# Patient Record
Sex: MALE | Race: BLACK OR AFRICAN AMERICAN | Employment: STUDENT | URBAN - METROPOLITAN AREA
[De-identification: names, ages, dates, MRNs, and addresses within clinical notes are randomized per-mention and may not be internally consistent; named-entity substitution may affect disease eponyms.]

---
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
## 2023-07-22 ENCOUNTER — HOSPITAL ENCOUNTER (EMERGENCY)
Facility: HOSPITAL | Age: 18
Discharge: HOME | End: 2023-07-22
Attending: EMERGENCY MEDICINE
Payer: COMMERCIAL

## 2023-07-22 ENCOUNTER — APPOINTMENT (OUTPATIENT)
Facility: HOSPITAL | Age: 18
End: 2023-07-22
Payer: COMMERCIAL

## 2023-07-22 VITALS
HEART RATE: 65 BPM | BODY MASS INDEX: 34.86 KG/M2 | TEMPERATURE: 98.5 F | WEIGHT: 230 LBS | OXYGEN SATURATION: 98 % | HEIGHT: 68 IN | DIASTOLIC BLOOD PRESSURE: 85 MMHG | SYSTOLIC BLOOD PRESSURE: 131 MMHG | RESPIRATION RATE: 16 BRPM
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
## 2023-07-22 DIAGNOSIS — M25.551 ACUTE RIGHT HIP PAIN: Primary | ICD-10-CM

## 2023-07-22 PROCEDURE — 6360000002 HC RX W HCPCS: Performed by: EMERGENCY MEDICINE

## 2023-07-22 PROCEDURE — 96372 THER/PROPH/DIAG INJ SC/IM: CPT

## 2023-07-22 PROCEDURE — 73502 X-RAY EXAM HIP UNI 2-3 VIEWS: CPT

## 2023-07-22 PROCEDURE — 99284 EMERGENCY DEPT VISIT MOD MDM: CPT

## 2023-07-22 RX ORDER — KETOROLAC TROMETHAMINE 30 MG/ML
30 INJECTION, SOLUTION INTRAMUSCULAR; INTRAVENOUS
Status: COMPLETED | OUTPATIENT
Start: 2023-07-22 | End: 2023-07-22

## 2023-07-22 RX ORDER — KETOROLAC TROMETHAMINE 10 MG/1
10 TABLET, FILM COATED ORAL EVERY 6 HOURS PRN
Qty: 20 TABLET | Refills: 0 | Status: SHIPPED | OUTPATIENT
Start: 2023-07-22 | End: 2023-07-23 | Stop reason: SDUPTHER

## 2023-07-22 RX ADMIN — KETOROLAC TROMETHAMINE 30 MG: 30 INJECTION, SOLUTION INTRAMUSCULAR; INTRAVENOUS at 20:27

## 2023-07-22 ASSESSMENT — LIFESTYLE VARIABLES
HOW MANY STANDARD DRINKS CONTAINING ALCOHOL DO YOU HAVE ON A TYPICAL DAY: PATIENT DOES NOT DRINK
HOW OFTEN DO YOU HAVE A DRINK CONTAINING ALCOHOL: NEVER

## 2023-07-22 ASSESSMENT — PAIN SCALES - GENERAL: PAINLEVEL_OUTOF10: 6
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
## 2023-07-23 RX ORDER — KETOROLAC TROMETHAMINE 10 MG/1
10 TABLET, FILM COATED ORAL EVERY 6 HOURS PRN
Qty: 20 TABLET | Refills: 0 | Status: SHIPPED | OUTPATIENT
Start: 2023-07-23

## 2023-07-23 NOTE — DISCHARGE INSTRUCTIONS
Thank you! Thank you for allowing me to care for you in the emergency department. It is my goal to provide you with excellent care. If you have not received excellent quality care, please ask to speak to the nurse manager. Please fill out the survey that will come to you by mail or email since we listen to your feedback! Below you will find a list of your tests from today's visit. Should you have any questions, please do not hesitate to call the emergency department. Labs  No results found for this or any previous visit (from the past 12 hour(s)). Radiologic Studies  XR HIP 2-3 VW W PELVIS RIGHT   Final Result   No acute abnormality.        ------------------------------------------------------------------------------------------------------------  The exam and treatment you received in the Emergency Department were for an urgent problem and are not intended as complete care. It is important that you follow-up with a doctor, nurse practitioner, or physician assistant to:  (1) confirm your diagnosis,  (2) re-evaluation of changes in your illness and treatment, and  (3) for ongoing care. Please take your discharge instructions with you when you go to your follow-up appointment. If you have any problem arranging a follow-up appointment, contact the Emergency Department. If your symptoms become worse or you do not improve as expected and you are unable to reach your health care provider, please return to the Emergency Department. We are available 24 hours a day. If a prescription has been provided, please have it filled as soon as possible to prevent a delay in treatment. If you have any questions or reservations about taking the medication due to side effects or interactions with other medications, please call your primary care provider or contact the ER.
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
## 2023-09-30 ENCOUNTER — HOSPITAL ENCOUNTER (EMERGENCY)
Facility: HOSPITAL | Age: 18
Discharge: HOME | End: 2023-09-30
Attending: EMERGENCY MEDICINE
Payer: COMMERCIAL

## 2023-09-30 VITALS
SYSTOLIC BLOOD PRESSURE: 142 MMHG | DIASTOLIC BLOOD PRESSURE: 85 MMHG | TEMPERATURE: 98.7 F | BODY MASS INDEX: 34.07 KG/M2 | WEIGHT: 230 LBS | OXYGEN SATURATION: 97 % | RESPIRATION RATE: 18 BRPM | HEART RATE: 75 BPM | HEIGHT: 69 IN

## 2023-09-30 DIAGNOSIS — Z20.2 STD EXPOSURE: Primary | ICD-10-CM

## 2023-09-30 PROCEDURE — 6370000000 HC RX 637 (ALT 250 FOR IP): Performed by: EMERGENCY MEDICINE

## 2023-09-30 PROCEDURE — 99283 EMERGENCY DEPT VISIT LOW MDM: CPT

## 2023-09-30 PROCEDURE — 87591 N.GONORRHOEAE DNA AMP PROB: CPT

## 2023-09-30 PROCEDURE — 87491 CHLMYD TRACH DNA AMP PROBE: CPT

## 2023-09-30 RX ORDER — DOXYCYCLINE HYCLATE 100 MG
100 TABLET ORAL 2 TIMES DAILY
Qty: 14 TABLET | Refills: 0 | Status: SHIPPED | OUTPATIENT
Start: 2023-09-30 | End: 2023-10-07

## 2023-09-30 RX ORDER — CEFUROXIME AXETIL 250 MG/1
1000 TABLET ORAL ONCE
Status: COMPLETED | OUTPATIENT
Start: 2023-09-30 | End: 2023-09-30

## 2023-09-30 RX ORDER — AZITHROMYCIN 500 MG/1
1000 TABLET, FILM COATED ORAL
Status: COMPLETED | OUTPATIENT
Start: 2023-09-30 | End: 2023-09-30

## 2023-09-30 RX ADMIN — CEFUROXIME AXETIL 1000 MG: 250 TABLET, FILM COATED ORAL at 20:59

## 2023-09-30 RX ADMIN — AZITHROMYCIN 1000 MG: 500 TABLET, FILM COATED ORAL at 20:59

## 2023-09-30 ASSESSMENT — PAIN - FUNCTIONAL ASSESSMENT: PAIN_FUNCTIONAL_ASSESSMENT: 0-10

## 2023-09-30 ASSESSMENT — PAIN SCALES - GENERAL: PAINLEVEL_OUTOF10: 0
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
## 2023-10-01 NOTE — ED TRIAGE NOTES
Pt states partner notified him that they tested positive for gonorrhea. Pt denies any symptoms, would like to be tested.

## 2023-10-03 LAB
C TRACH DNA SPEC QL NAA+PROBE: NEGATIVE
N GONORRHOEA DNA SPEC QL NAA+PROBE: NEGATIVE
SAMPLE TYPE: NORMAL
SERVICE CMNT-IMP: NORMAL
SPECIMEN SOURCE: NORMAL